# Patient Record
Sex: FEMALE | Race: WHITE | Employment: UNEMPLOYED | URBAN - METROPOLITAN AREA
[De-identification: names, ages, dates, MRNs, and addresses within clinical notes are randomized per-mention and may not be internally consistent; named-entity substitution may affect disease eponyms.]

---

## 2018-10-10 ENCOUNTER — HOSPITAL ENCOUNTER (EMERGENCY)
Age: 53
Discharge: HOME | End: 2018-10-10
Payer: COMMERCIAL

## 2018-10-10 VITALS
HEIGHT: 66 IN | WEIGHT: 275.13 LBS | RESPIRATION RATE: 18 BRPM | SYSTOLIC BLOOD PRESSURE: 145 MMHG | TEMPERATURE: 98.7 F | BODY MASS INDEX: 44.22 KG/M2 | DIASTOLIC BLOOD PRESSURE: 81 MMHG | OXYGEN SATURATION: 97 % | HEART RATE: 83 BPM

## 2018-10-10 DIAGNOSIS — I83.899 BLEEDING FROM VARICOSE VEIN: Primary | ICD-10-CM

## 2018-10-10 PROCEDURE — 99282 EMERGENCY DEPT VISIT SF MDM: CPT

## 2018-10-10 ASSESSMENT — ENCOUNTER SYMPTOMS
NAUSEA: 0
VOMITING: 0

## 2018-10-10 NOTE — ED PROVIDER NOTES
for level 5)     ED Triage Vitals [10/10/18 1557]   BP Temp Temp Source Pulse Resp SpO2 Height Weight   (!) 145/81 98.7 °F (37.1 °C) Oral 83 18 97 % 5' 6\" (1.676 m) 275 lb 2.2 oz (124.8 kg)       Physical Exam   Constitutional: She is oriented to person, place, and time. She appears well-developed and well-nourished. No distress. HENT:   Head: Normocephalic and atraumatic. Right Ear: External ear normal.   Left Ear: External ear normal.   Mouth/Throat: Oropharynx is clear and moist.   Eyes: EOM are normal.   Neck: Normal range of motion. Neck supple. Pulmonary/Chest: Effort normal. No respiratory distress. Musculoskeletal: Normal range of motion. Neurological: She is alert and oriented to person, place, and time. Skin: Skin is warm and dry. She is not diaphoretic. Small pinpoint scabbed area over varicose vein on the medial right ankle. No surrounding erythema or edema. No active bleeding. No TTP. DP pulse 2+   Psychiatric: She has a normal mood and affect. Her behavior is normal. Judgment and thought content normal.       DIFFERENTIAL DIAGNOSIS   Coagulopathy, bleeding varicose vein    DIAGNOSTIC RESULTS       RADIOLOGY:   Non-plain film images such as CT, Ultrasound and MRI are read by the radiologist. Plain radiographic images are visualized and preliminarily interpreted by FLORENTINO Molina with the below findings:      Interpretation per the Radiologist below, if available at the time of this note:    No orders to display         LABS:  No results found for this visit on 10/10/18. All other labs were within normal range or not returned as of this dictation. EMERGENCY DEPARTMENT COURSE and DIFFERENTIAL DIAGNOSIS/MDM:   Vitals:    Vitals:    10/10/18 1557   BP: (!) 145/81   Pulse: 83   Resp: 18   Temp: 98.7 °F (37.1 °C)   TempSrc: Oral   SpO2: 97%   Weight: 275 lb 2.2 oz (124.8 kg)   Height: 5' 6\" (1.676 m)       Patient was seen and examined by myself.   Supervising physician was

## 2020-05-30 ENCOUNTER — APPOINTMENT (OUTPATIENT)
Dept: GENERAL RADIOLOGY | Age: 55
End: 2020-05-30
Payer: COMMERCIAL

## 2020-05-30 ENCOUNTER — APPOINTMENT (OUTPATIENT)
Dept: CT IMAGING | Age: 55
End: 2020-05-30
Payer: COMMERCIAL

## 2020-05-30 ENCOUNTER — HOSPITAL ENCOUNTER (EMERGENCY)
Age: 55
Discharge: HOME | End: 2020-05-30
Payer: COMMERCIAL

## 2020-05-30 VITALS
HEART RATE: 77 BPM | RESPIRATION RATE: 16 BRPM | WEIGHT: 278.88 LBS | SYSTOLIC BLOOD PRESSURE: 136 MMHG | TEMPERATURE: 98 F | OXYGEN SATURATION: 98 % | DIASTOLIC BLOOD PRESSURE: 82 MMHG | HEIGHT: 66 IN | BODY MASS INDEX: 44.82 KG/M2

## 2020-05-30 LAB
A/G RATIO: 0.8 (ref 1.1–2.2)
ALBUMIN SERPL-MCNC: 4 G/DL (ref 3.4–5)
ALP BLD-CCNC: 82 U/L (ref 40–129)
ALT SERPL-CCNC: 29 U/L (ref 10–40)
ANION GAP SERPL CALCULATED.3IONS-SCNC: 11 MMOL/L (ref 3–16)
AST SERPL-CCNC: 31 U/L (ref 15–37)
BASOPHILS ABSOLUTE: 0 K/UL (ref 0–0.2)
BASOPHILS RELATIVE PERCENT: 0.4 %
BILIRUB SERPL-MCNC: 0.5 MG/DL (ref 0–1)
BUN BLDV-MCNC: 11 MG/DL (ref 7–20)
CALCIUM SERPL-MCNC: 10 MG/DL (ref 8.3–10.6)
CHLORIDE BLD-SCNC: 100 MMOL/L (ref 99–110)
CO2: 27 MMOL/L (ref 21–32)
CREAT SERPL-MCNC: 0.8 MG/DL (ref 0.6–1.1)
EOSINOPHILS ABSOLUTE: 0.1 K/UL (ref 0–0.6)
EOSINOPHILS RELATIVE PERCENT: 1.9 %
GFR AFRICAN AMERICAN: >60
GFR NON-AFRICAN AMERICAN: >60
GLOBULIN: 4.8 G/DL
GLUCOSE BLD-MCNC: 89 MG/DL (ref 70–99)
HCG QUALITATIVE: NEGATIVE
HCT VFR BLD CALC: 37.5 % (ref 36–48)
HEMOGLOBIN: 12.1 G/DL (ref 12–16)
INR BLD: 1.27 (ref 0.86–1.14)
LYMPHOCYTES ABSOLUTE: 0.9 K/UL (ref 1–5.1)
LYMPHOCYTES RELATIVE PERCENT: 12.6 %
MCH RBC QN AUTO: 28 PG (ref 26–34)
MCHC RBC AUTO-ENTMCNC: 32.3 G/DL (ref 31–36)
MCV RBC AUTO: 86.7 FL (ref 80–100)
MONOCYTES ABSOLUTE: 0.4 K/UL (ref 0–1.3)
MONOCYTES RELATIVE PERCENT: 6.3 %
NEUTROPHILS ABSOLUTE: 5.6 K/UL (ref 1.7–7.7)
NEUTROPHILS RELATIVE PERCENT: 78.8 %
PDW BLD-RTO: 14.3 % (ref 12.4–15.4)
PLATELET # BLD: 297 K/UL (ref 135–450)
PMV BLD AUTO: 7.9 FL (ref 5–10.5)
POTASSIUM REFLEX MAGNESIUM: 4.6 MMOL/L (ref 3.5–5.1)
PRO-BNP: 32 PG/ML (ref 0–124)
PROTHROMBIN TIME: 14.8 SEC (ref 10–13.2)
RBC # BLD: 4.33 M/UL (ref 4–5.2)
SODIUM BLD-SCNC: 138 MMOL/L (ref 136–145)
TOTAL PROTEIN: 8.8 G/DL (ref 6.4–8.2)
TROPONIN: <0.01 NG/ML
WBC # BLD: 7.1 K/UL (ref 4–11)

## 2020-05-30 PROCEDURE — 90471 IMMUNIZATION ADMIN: CPT | Performed by: PHYSICIAN ASSISTANT

## 2020-05-30 PROCEDURE — 87077 CULTURE AEROBIC IDENTIFY: CPT

## 2020-05-30 PROCEDURE — 87205 SMEAR GRAM STAIN: CPT

## 2020-05-30 PROCEDURE — 90715 TDAP VACCINE 7 YRS/> IM: CPT | Performed by: PHYSICIAN ASSISTANT

## 2020-05-30 PROCEDURE — 84484 ASSAY OF TROPONIN QUANT: CPT

## 2020-05-30 PROCEDURE — 87070 CULTURE OTHR SPECIMN AEROBIC: CPT

## 2020-05-30 PROCEDURE — 87186 SC STD MICRODIL/AGAR DIL: CPT

## 2020-05-30 PROCEDURE — 96374 THER/PROPH/DIAG INJ IV PUSH: CPT

## 2020-05-30 PROCEDURE — 80053 COMPREHEN METABOLIC PANEL: CPT

## 2020-05-30 PROCEDURE — 71250 CT THORAX DX C-: CPT

## 2020-05-30 PROCEDURE — 84703 CHORIONIC GONADOTROPIN ASSAY: CPT

## 2020-05-30 PROCEDURE — 83880 ASSAY OF NATRIURETIC PEPTIDE: CPT

## 2020-05-30 PROCEDURE — 85610 PROTHROMBIN TIME: CPT

## 2020-05-30 PROCEDURE — 36415 COLL VENOUS BLD VENIPUNCTURE: CPT

## 2020-05-30 PROCEDURE — 71046 X-RAY EXAM CHEST 2 VIEWS: CPT

## 2020-05-30 PROCEDURE — 6360000002 HC RX W HCPCS: Performed by: PHYSICIAN ASSISTANT

## 2020-05-30 PROCEDURE — 85025 COMPLETE CBC W/AUTO DIFF WBC: CPT

## 2020-05-30 PROCEDURE — 99284 EMERGENCY DEPT VISIT MOD MDM: CPT

## 2020-05-30 RX ORDER — DEXAMETHASONE SODIUM PHOSPHATE 10 MG/ML
8 INJECTION INTRAMUSCULAR; INTRAVENOUS ONCE
Status: COMPLETED | OUTPATIENT
Start: 2020-05-30 | End: 2020-05-30

## 2020-05-30 RX ORDER — PREDNISONE 10 MG/1
TABLET ORAL
Qty: 18 TABLET | Refills: 0 | Status: SHIPPED | OUTPATIENT
Start: 2020-05-30 | End: 2020-06-09

## 2020-05-30 RX ORDER — FUROSEMIDE 20 MG/1
20 TABLET ORAL EVERY MORNING
Qty: 7 TABLET | Refills: 0 | Status: SHIPPED | OUTPATIENT
Start: 2020-05-30 | End: 2020-06-18

## 2020-05-30 RX ORDER — SULFAMETHOXAZOLE AND TRIMETHOPRIM 800; 160 MG/1; MG/1
1 TABLET ORAL 2 TIMES DAILY
Qty: 20 TABLET | Refills: 0 | Status: SHIPPED | OUTPATIENT
Start: 2020-05-30 | End: 2020-06-09

## 2020-05-30 RX ORDER — LEVOCETIRIZINE DIHYDROCHLORIDE 5 MG/1
5 TABLET, FILM COATED ORAL NIGHTLY
Qty: 15 TABLET | Refills: 0 | Status: SHIPPED | OUTPATIENT
Start: 2020-05-30 | End: 2020-06-14

## 2020-05-30 RX ORDER — CEPHALEXIN 500 MG/1
500 CAPSULE ORAL 3 TIMES DAILY
Qty: 30 CAPSULE | Refills: 0 | Status: SHIPPED | OUTPATIENT
Start: 2020-05-30 | End: 2020-06-09

## 2020-05-30 RX ADMIN — DEXAMETHASONE SODIUM PHOSPHATE 8 MG: 10 INJECTION INTRAMUSCULAR; INTRAVENOUS at 23:19

## 2020-05-30 RX ADMIN — TETANUS TOXOID, REDUCED DIPHTHERIA TOXOID AND ACELLULAR PERTUSSIS VACCINE, ADSORBED 0.5 ML: 5; 2.5; 8; 8; 2.5 SUSPENSION INTRAMUSCULAR at 23:19

## 2020-05-30 ASSESSMENT — PAIN SCALES - GENERAL: PAINLEVEL_OUTOF10: 0

## 2020-05-30 NOTE — ED PROVIDER NOTES
[address] [facility]        Pt Name: Kassie Gambino  MRN: [id number]  [place] 1965  Date of evaluation: 5/30/2020  Provider: Jaja Partida PA-C  PCP: No primary care provider on file. Evaluation by GALILEA. My supervising physician was available for consultation. Emil Whitaker MD      CHIEF COMPLAINT       Chief Complaint   Patient presents with    Wound Check     patient states worsening left lower leg red and skin weeping x 2 days. denies trauma or hx of similar incidents. to ED for eval       HISTORY OF PRESENT ILLNESS   (Location, Timing/Onset, Context/Setting, Quality, Duration, Modifying Factors, Severity, Associated Signs and Symptoms)  Note limiting factors. Kassie Gambino is a 53 y.o. female patient presenting with her mother. Patient with complaint swelling and drainage bilateral lower legs left greater than right. Patient states normally has large legs x5 months. No known diagnosis as to why she has large edematous legs. I could speculate lymphedema perhaps related to morbid obesity as she is 5 foot 6 inches, 278 pounds and a BMI score of 45.1. She reports cutting grass about 1 week ago and discovered itching following itch continues and the drainage has progressed over the past few days. She also has similar rash and excoriation on the volar aspect both forearms. No drainage at this area. She indicates no fevers, chills or systemic ill complaints. She denies any chest pain or shortness of breath. She has not seen a doctor for quite some time. Does not take medication on a regular basis. Tetanus shot not known. I did look in the chart system I find visits going back to 2008 and none since. Does not have a PCP. Nursing Notes were all reviewed and agreed with or any disagreements were addressed in the HPI.     REVIEW OF SYSTEMS    (2-9 systems for level 4, 10 or more for level 5)     Review of Laboratory  [address] [facility]   Phone [phone]       All other labs were within normal range or not returned as of this dictation. EKG: All EKG's are interpreted by the Emergency Department Physician in the absence of a cardiologist.  Please see their note for interpretation of EKG. RADIOLOGY:   Non-plain film images such as CT, Ultrasound and MRI are read by the radiologist. Plain radiographic images are visualized and preliminarily interpreted by the ED Provider with the below findings:        Interpretation per the Radiologist below, if available at the time of this note:    CT CHEST WO CONTRAST   Final Result   Ascending thoracic aortic aneurysm measuring up to 5.0 cm.      6 mm ground-glass nodular opacity in the right upper lobe near the apex. There may be a small calcified component. Recommend follow-up in 6 months. Axillary adenopathy measuring up to 2.4 cm on the left. While findings may   be reactive, metastatic disease cannot be excluded. XR CHEST STANDARD (2 VW)   Final Result   Mild enlargement of the heart. Nonspecific density causing contour defect along the mid right mediastinal   shadow, with suggestion of anterior mediastinal mass or other space-occupying   process on the lateral chest x-ray view. Recommend further evaluation with a   CT of the chest.           No results found.         PROCEDURES   Unless otherwise noted below, none     Procedures    CRITICAL CARE TIME   N/A    CONSULTS:  None      EMERGENCY DEPARTMENT COURSE and DIFFERENTIAL DIAGNOSIS/MDM:   Vitals:    Vitals:    05/30/20 1751 05/30/20 2314   BP: (!) 152/85 136/82   Pulse: 78 77   Resp: 18 16   Temp: 98 °F (36.7 °C) 98 °F (36.7 °C)   TempSrc: Oral Oral   SpO2: 97% 98%   Weight: 278 lb 14.1 oz (126.5 kg)    Height: 5' 6\" (1.676 m)        Patient was given the following medications:  Medications   Tetanus-Diphth-Acell Pertussis (BOOSTRIX) injection 0.5 mL (0.5 mLs Intramuscular Given 5/30/20 2319)   dexamethasone (DECADRON) injection 8 mg (8 mg Intravenous Given 5/30/20 2319)     At approximately 9:10 PM I reassessed patient. I informed laboratory studies unremarkable. Chest x-ray showing mild cardiomegaly without pulmonary edema however does show a mediastinal mass. Recommendation by radiology was to proceed with CT scan. Patient consents. Order placed. CT scan shows evidence of a 5 cm ascending aortic aneurysm. No evidence of dissection. Patient asymptomatic. This is an incidental finding but the patient is informed. As her mother is in the room mother indicates she has the same finding. Patient does have contact dermatitis with subsequent irritation both lower legs with potential emerging cellulitic process both lower legs greater than right. Patient also with dependent edema of unclear reason. I do not find clear evidence of heart failure. Low suspicion for bilateral DVT. I do suspect dependency and obesity. Patient has normal renal function. I did prescribe Lasix 20 mg #7 taking 1 daily. In addition I did prescribe Bactrim DS twice daily x10 days and Keflex 500 mg 3 times daily x10 days. I did prescribe prednisone taper dose beginning 30 mg over the next 9 days. I did prescribe Xyzal 5 mg antihistamine taken nightly. She does not have a healthcare provider. I did place an urgent referral to [facility] provider for general health care purposes. The patient does express understanding of her diagnosis and treatment plan. I did place referral to Dr. Leo Maldonado cardiothoracic surgery for aneurysm. FINAL IMPRESSION      1. Irritant contact dermatitis, unspecified trigger    2. Cellulitis of both lower extremities    3. Bilateral lower extremity edema    4. Stasis dermatitis of both legs    5.  Class 3 severe obesity with body mass index (BMI) of 45.0 to 49.9 in adult, unspecified obesity type, unspecified whether serious comorbidity present ([facility] [place] [facility]    6. Ascending aortic aneurysm [facility])          DISPOSITION/PLAN   DISPOSITION        PATIENT REFERREDTO:  Ronna Rodgers 62  [address] [address]  [phone]    Schedule an appointment as soon as possible for a visit in 1 week      [facility]) Pre-Services  [phone]  Schedule an appointment as soon as possible for a visit in 3 days        DISCHARGE MEDICATIONS:  Discharge Medication List as of 5/30/2020 11:17 PM      START taking these medications    Details   furosemide (LASIX) 20 MG tablet Take 1 tablet by mouth every morning for 7 days, Disp-7 tablet, R-0Print      sulfamethoxazole-trimethoprim (BACTRIM DS) 800-160 MG per tablet Take 1 tablet by mouth 2 times daily for 10 days, Disp-20 tablet, R-0Print      cephALEXin (KEFLEX) 500 MG capsule Take 1 capsule by mouth 3 times daily for 10 days, Disp-30 capsule, R-0Print      predniSONE (DELTASONE) 10 MG tablet 3 tabs po qam for 3 days then 2 tabs qam for 3 days the 1 tab qam for 3 days, Disp-18 tablet, R-0Print      levocetirizine (XYZAL) 5 MG tablet Take 1 tablet by mouth nightly for 15 days, Disp-15 tablet, R-0Print             DISCONTINUED MEDICATIONS:  Discharge Medication List as of 5/30/2020 11:17 PM                 (Please note that portions of this note were completed with a voice recognition program.  Efforts were made to edit the dictations but occasionally words are mis-transcribed. )    Lynn Mcbride PA-C (electronically signed)           Lynn Mcbride PA-C  05/31/20 9724

## 2020-05-31 NOTE — ED TRIAGE NOTES
Pt arrived to ED via private vehicle with complaints of wounds. On initial assessment, pt patient states worsening left lower leg red and skin weeping x 2 days. denies trauma or hx of similar incidents. to ED for eval. Pt states bilateral leg swelling for 5 months. VS noted and stable. Patient A&Ox4. Respirations easy and unlabored. Skin warm and dry and appropriate for ethnicity. No acute distress noted at this time.

## 2020-06-03 LAB
GRAM STAIN RESULT: ABNORMAL
ORGANISM: ABNORMAL
WOUND/ABSCESS: ABNORMAL

## 2020-06-05 ENCOUNTER — TELEPHONE (OUTPATIENT)
Dept: INTERNAL MEDICINE CLINIC | Age: 55
End: 2020-06-05

## 2020-06-18 ENCOUNTER — OFFICE VISIT (OUTPATIENT)
Dept: INTERNAL MEDICINE CLINIC | Age: 55
End: 2020-06-18
Payer: COMMERCIAL

## 2020-06-18 VITALS
WEIGHT: 284.4 LBS | HEART RATE: 83 BPM | TEMPERATURE: 99.1 F | RESPIRATION RATE: 18 BRPM | DIASTOLIC BLOOD PRESSURE: 90 MMHG | SYSTOLIC BLOOD PRESSURE: 154 MMHG | BODY MASS INDEX: 45.9 KG/M2 | OXYGEN SATURATION: 98 %

## 2020-06-18 PROBLEM — R91.1 INCIDENTAL LUNG NODULE, > 3MM AND < 8MM: Status: ACTIVE | Noted: 2020-06-18

## 2020-06-18 PROBLEM — I10 ESSENTIAL HYPERTENSION: Status: ACTIVE | Noted: 2020-06-18

## 2020-06-18 PROBLEM — E66.01 MORBID OBESITY WITH BMI OF 45.0-49.9, ADULT (HCC): Status: ACTIVE | Noted: 2020-06-18

## 2020-06-18 PROBLEM — M79.89 LOCALIZED SWELLING OF LOWER EXTREMITY: Status: ACTIVE | Noted: 2020-06-18

## 2020-06-18 PROBLEM — I71.20 THORACIC AORTIC ANEURYSM WITHOUT RUPTURE: Status: ACTIVE | Noted: 2020-06-18

## 2020-06-18 PROCEDURE — G8427 DOCREV CUR MEDS BY ELIG CLIN: HCPCS | Performed by: INTERNAL MEDICINE

## 2020-06-18 PROCEDURE — 1036F TOBACCO NON-USER: CPT | Performed by: INTERNAL MEDICINE

## 2020-06-18 PROCEDURE — 99204 OFFICE O/P NEW MOD 45 MIN: CPT | Performed by: INTERNAL MEDICINE

## 2020-06-18 PROCEDURE — G8417 CALC BMI ABV UP PARAM F/U: HCPCS | Performed by: INTERNAL MEDICINE

## 2020-06-18 PROCEDURE — 3017F COLORECTAL CA SCREEN DOC REV: CPT | Performed by: INTERNAL MEDICINE

## 2020-06-18 RX ORDER — FUROSEMIDE 20 MG/1
20 TABLET ORAL DAILY
Qty: 60 TABLET | Refills: 3 | Status: SHIPPED | OUTPATIENT
Start: 2020-06-18 | End: 2022-08-04

## 2020-06-18 RX ORDER — LISINOPRIL 20 MG/1
20 TABLET ORAL DAILY
Qty: 60 TABLET | Refills: 3 | Status: SHIPPED | OUTPATIENT
Start: 2020-06-18 | End: 2020-08-06 | Stop reason: DRUGHIGH

## 2020-06-18 ASSESSMENT — ENCOUNTER SYMPTOMS
NAUSEA: 0
BLOOD IN STOOL: 0
SHORTNESS OF BREATH: 0
ABDOMINAL PAIN: 0
ABDOMINAL DISTENTION: 0
VOMITING: 0
SINUS PAIN: 0
COUGH: 0
CONSTIPATION: 0
WHEEZING: 0
RHINORRHEA: 0
EYE DISCHARGE: 0
CHEST TIGHTNESS: 0
PHOTOPHOBIA: 0
DIARRHEA: 0
EYE PAIN: 0
BACK PAIN: 0

## 2020-06-18 ASSESSMENT — PATIENT HEALTH QUESTIONNAIRE - PHQ9
SUM OF ALL RESPONSES TO PHQ9 QUESTIONS 1 & 2: 0
1. LITTLE INTEREST OR PLEASURE IN DOING THINGS: 0
2. FEELING DOWN, DEPRESSED OR HOPELESS: 0
SUM OF ALL RESPONSES TO PHQ QUESTIONS 1-9: 0
SUM OF ALL RESPONSES TO PHQ QUESTIONS 1-9: 0

## 2020-06-18 NOTE — PROGRESS NOTES
2020    Jefferson Samuels (:  1965) is a 53 y.o. female, here for evaluation of the following medical concerns:    HPI  The pt is a 52YOF with morbid obesity who was seen in the ED recently for leg swelling and weeping. She presents to the clinic today to establish PCP. The pt has not been to a doctor since she was 24. She appears to be unkept with poor hygiene. She is able to walk but uses a walker now a days. She does not exercise. No smoking or drinking ETOH. She is quite behind in her screening. She also c/o pain in her right knee ongoing. She also has swelling of her b/l lower ext. This is also chronic. She is wondering if there is anything wrong with her heart. Review of Systems   Constitutional: Negative for activity change, appetite change, chills, fatigue, fever and unexpected weight change. HENT: Negative for congestion, ear discharge, ear pain, mouth sores, nosebleeds, rhinorrhea, sinus pain and sneezing. Eyes: Negative for photophobia, pain, discharge and visual disturbance. Respiratory: Negative for cough, chest tightness, shortness of breath and wheezing. Cardiovascular: Positive for leg swelling. Negative for chest pain and palpitations. Gastrointestinal: Negative for abdominal distention, abdominal pain, blood in stool, constipation, diarrhea, nausea and vomiting. Endocrine: Negative for polydipsia, polyphagia and polyuria. Genitourinary: Negative for dysuria, flank pain, hematuria, menstrual problem, vaginal bleeding and vaginal discharge. Musculoskeletal: Negative for back pain and gait problem. Right knee pain   Skin: Negative for pallor, rash and wound. Neurological: Negative for dizziness, tremors, facial asymmetry, speech difficulty, weakness, numbness and headaches. Psychiatric/Behavioral: Negative for agitation, confusion, dysphoric mood, self-injury and suicidal ideas. The patient is not nervous/anxious.     All other systems reviewed and are negative. Prior to Visit Medications    Medication Sig Taking? Authorizing Provider   lisinopril (PRINIVIL;ZESTRIL) 20 MG tablet Take 1 tablet by mouth daily Yes Vic Holly MD   furosemide (LASIX) 20 MG tablet Take 1 tablet by mouth daily Yes Vic Holly MD   diclofenac sodium (VOLTAREN) 1 % GEL Apply 4 g topically 4 times daily Yes Vic Holly MD        No Known Allergies    Past Medical History:   Diagnosis Date    Back pain 2008    Cataract     Obesity     BMI 45 6/2020       Past Surgical History:   Procedure Laterality Date    CATARACT REMOVAL Left        Social History     Tobacco Use    Smoking status: Never Smoker    Smokeless tobacco: Never Used   Substance Use Topics    Alcohol use: No    Drug use: Never         Family History   Problem Relation Age of Onset    Heart Disease Father         age 76    Breast Cancer Maternal Grandmother     Rheum Arthritis Mother        Vitals:    06/18/20 1325 06/18/20 1327   BP: (!) 152/92 (!) 154/90   Site: Left Upper Arm Left Upper Arm   Position: Sitting Sitting   Cuff Size: Large Adult Large Adult   Pulse: 83    Resp: 18    Temp: 99.1 °F (37.3 °C)    SpO2: 98%    Weight: 284 lb 6.4 oz (129 kg)      Estimated body mass index is 45.9 kg/m² as calculated from the following:    Height as of 5/30/20: 5' 6\" (1.676 m). Weight as of this encounter: 284 lb 6.4 oz (129 kg). Physical Exam  Vitals signs and nursing note reviewed. Constitutional:       Appearance: Normal appearance. She is obese. HENT:      Head: Normocephalic and atraumatic. Right Ear: Tympanic membrane normal.      Left Ear: Tympanic membrane and ear canal normal.      Nose: Nose normal.      Mouth/Throat:      Mouth: Mucous membranes are moist.   Eyes:      Extraocular Movements: Extraocular movements intact. Pupils: Pupils are equal, round, and reactive to light. Neck:      Musculoskeletal: Normal range of motion and neck supple.    Cardiovascular:      Rate osteoartthritis prn diclofenac  - XR KNEE RIGHT (3 VIEWS); Future    6. Thoracic aortic aneurysm without rupture [facility])    - Chris Muñoz MD, Vascular Surgery, [facility]    7. Essential hypertension  Started on lisinopril and lasix daily    8. Morbid obesity with BMI of 45.0-49.9, adult ([address]  Advised about wt loss will address again in the next visit    9. Localized swelling of lower extremity  Started on lasix    10. Incidental lung nodule, > 3mm and < 8mm  F/u in 6 months      Return in about 6 weeks (around 7/30/2020).

## 2020-06-19 ENCOUNTER — TELEPHONE (OUTPATIENT)
Dept: SURGERY | Age: 55
End: 2020-06-19

## 2020-06-19 NOTE — TELEPHONE ENCOUNTER
Dr. Buddy Coleman received a referral for Karime Lima for a thoracic aortic aneurysm. Germania Yepez said she should really be seeing Dr. Lily Mai in cardiology, as he doesn't do this type of aortic aneurysm, so I'm sending the referral back to the office. Just wanted you to know why it came back to you.

## 2020-06-23 ENCOUNTER — HOSPITAL ENCOUNTER (OUTPATIENT)
Dept: GENERAL RADIOLOGY | Age: 55
Discharge: HOME | End: 2020-06-23
Payer: COMMERCIAL

## 2020-06-23 ENCOUNTER — HOSPITAL ENCOUNTER (OUTPATIENT)
Age: 55
Discharge: HOME | End: 2020-06-23
Payer: COMMERCIAL

## 2020-06-23 PROCEDURE — 73562 X-RAY EXAM OF KNEE 3: CPT

## 2020-06-26 ENCOUNTER — NURSE ONLY (OUTPATIENT)
Dept: INTERNAL MEDICINE CLINIC | Age: 55
End: 2020-06-26
Payer: COMMERCIAL

## 2020-06-26 LAB
ALBUMIN SERPL-MCNC: 4 G/DL (ref 3.4–5)
ALP BLD-CCNC: 97 U/L (ref 40–129)
ALT SERPL-CCNC: 16 U/L (ref 10–40)
AST SERPL-CCNC: 18 U/L (ref 15–37)
BILIRUB SERPL-MCNC: 0.3 MG/DL (ref 0–1)
BILIRUBIN DIRECT: <0.2 MG/DL (ref 0–0.3)
BILIRUBIN, INDIRECT: NORMAL MG/DL (ref 0–1)
CHOLESTEROL, FASTING: 175 MG/DL (ref 0–199)
HDLC SERPL-MCNC: 31 MG/DL (ref 40–60)
HEPATITIS C ANTIBODY INTERPRETATION: NORMAL
LDL CHOLESTEROL CALCULATED: 104 MG/DL
TOTAL PROTEIN: 8.1 G/DL (ref 6.4–8.2)
TRIGLYCERIDE, FASTING: 200 MG/DL (ref 0–150)
VLDLC SERPL CALC-MCNC: 40 MG/DL

## 2020-06-26 PROCEDURE — 36415 COLL VENOUS BLD VENIPUNCTURE: CPT | Performed by: INTERNAL MEDICINE

## 2020-06-27 LAB
ESTIMATED AVERAGE GLUCOSE: 116.9 MG/DL
HBA1C MFR BLD: 5.7 %

## 2020-06-28 RX ORDER — ATORVASTATIN CALCIUM 20 MG/1
20 TABLET, FILM COATED ORAL DAILY
Qty: 90 TABLET | Refills: 1 | Status: SHIPPED | OUTPATIENT
Start: 2020-06-28 | End: 2022-08-04

## 2020-07-29 ENCOUNTER — TELEPHONE (OUTPATIENT)
Dept: INTERNAL MEDICINE CLINIC | Age: 55
End: 2020-07-29

## 2020-08-06 ENCOUNTER — OFFICE VISIT (OUTPATIENT)
Dept: INTERNAL MEDICINE CLINIC | Age: 55
End: 2020-08-06
Payer: COMMERCIAL

## 2020-08-06 VITALS
DIASTOLIC BLOOD PRESSURE: 94 MMHG | TEMPERATURE: 98.7 F | BODY MASS INDEX: 44.45 KG/M2 | OXYGEN SATURATION: 98 % | RESPIRATION RATE: 18 BRPM | HEART RATE: 74 BPM | SYSTOLIC BLOOD PRESSURE: 140 MMHG | WEIGHT: 275.4 LBS

## 2020-08-06 PROBLEM — R91.8 LUNG NODULES: Status: ACTIVE | Noted: 2020-08-06

## 2020-08-06 PROBLEM — E78.2 MIXED HYPERLIPIDEMIA: Status: ACTIVE | Noted: 2020-08-06

## 2020-08-06 PROBLEM — R73.03 PREDIABETES: Status: ACTIVE | Noted: 2020-08-06

## 2020-08-06 PROBLEM — R59.0 AXILLARY LYMPHADENOPATHY: Status: ACTIVE | Noted: 2020-08-06

## 2020-08-06 PROBLEM — R06.83 SNORING: Status: ACTIVE | Noted: 2020-08-06

## 2020-08-06 LAB
ANION GAP SERPL CALCULATED.3IONS-SCNC: 11 MMOL/L (ref 3–16)
BUN BLDV-MCNC: 11 MG/DL (ref 7–20)
CALCIUM SERPL-MCNC: 9.9 MG/DL (ref 8.3–10.6)
CHLORIDE BLD-SCNC: 105 MMOL/L (ref 99–110)
CO2: 24 MMOL/L (ref 21–32)
CREAT SERPL-MCNC: 0.9 MG/DL (ref 0.6–1.1)
GFR AFRICAN AMERICAN: >60
GFR NON-AFRICAN AMERICAN: >60
GLUCOSE BLD-MCNC: 102 MG/DL (ref 70–99)
POTASSIUM SERPL-SCNC: 4.3 MMOL/L (ref 3.5–5.1)
SODIUM BLD-SCNC: 140 MMOL/L (ref 136–145)

## 2020-08-06 PROCEDURE — 99215 OFFICE O/P EST HI 40 MIN: CPT | Performed by: INTERNAL MEDICINE

## 2020-08-06 PROCEDURE — G8417 CALC BMI ABV UP PARAM F/U: HCPCS | Performed by: INTERNAL MEDICINE

## 2020-08-06 PROCEDURE — 1036F TOBACCO NON-USER: CPT | Performed by: INTERNAL MEDICINE

## 2020-08-06 PROCEDURE — G8427 DOCREV CUR MEDS BY ELIG CLIN: HCPCS | Performed by: INTERNAL MEDICINE

## 2020-08-06 PROCEDURE — 36415 COLL VENOUS BLD VENIPUNCTURE: CPT | Performed by: INTERNAL MEDICINE

## 2020-08-06 PROCEDURE — 3017F COLORECTAL CA SCREEN DOC REV: CPT | Performed by: INTERNAL MEDICINE

## 2020-08-06 RX ORDER — LISINOPRIL 30 MG/1
30 TABLET ORAL DAILY
Qty: 30 TABLET | Refills: 1 | Status: SHIPPED | OUTPATIENT
Start: 2020-08-06 | End: 2022-08-04

## 2020-08-06 SDOH — ECONOMIC STABILITY: FOOD INSECURITY: WITHIN THE PAST 12 MONTHS, THE FOOD YOU BOUGHT JUST DIDN'T LAST AND YOU DIDN'T HAVE MONEY TO GET MORE.: NEVER TRUE

## 2020-08-06 SDOH — ECONOMIC STABILITY: INCOME INSECURITY: HOW HARD IS IT FOR YOU TO PAY FOR THE VERY BASICS LIKE FOOD, HOUSING, MEDICAL CARE, AND HEATING?: NOT HARD AT ALL

## 2020-08-06 SDOH — ECONOMIC STABILITY: TRANSPORTATION INSECURITY
IN THE PAST 12 MONTHS, HAS THE LACK OF TRANSPORTATION KEPT YOU FROM MEDICAL APPOINTMENTS OR FROM GETTING MEDICATIONS?: NO

## 2020-08-06 SDOH — ECONOMIC STABILITY: TRANSPORTATION INSECURITY
IN THE PAST 12 MONTHS, HAS LACK OF TRANSPORTATION KEPT YOU FROM MEETINGS, WORK, OR FROM GETTING THINGS NEEDED FOR DAILY LIVING?: NO

## 2020-08-06 SDOH — ECONOMIC STABILITY: FOOD INSECURITY: WITHIN THE PAST 12 MONTHS, YOU WORRIED THAT YOUR FOOD WOULD RUN OUT BEFORE YOU GOT MONEY TO BUY MORE.: NEVER TRUE

## 2020-08-06 ASSESSMENT — ENCOUNTER SYMPTOMS
WHEEZING: 0
ABDOMINAL DISTENTION: 0
ABDOMINAL PAIN: 0
CHEST TIGHTNESS: 0
VOMITING: 0
NAUSEA: 0
SHORTNESS OF BREATH: 0
COUGH: 0
PHOTOPHOBIA: 0
CONSTIPATION: 0
EYE PAIN: 0
EYE DISCHARGE: 0
BLOOD IN STOOL: 0
RHINORRHEA: 0
SINUS PAIN: 0
DIARRHEA: 0
BACK PAIN: 0

## 2020-08-06 NOTE — PROGRESS NOTES
2020     Rosendo Lott (:  1965) is a 53 y.o. female, here for evaluation of the following medical concerns:    HPI    The pt is doing much better in terms of her leg swelling. She is putting on ACE wraps and did go to the wound clinic. She is also using lasix as well. Her mother had lung cancer and was undergoing radiation therapy so she did not get a chance to go for other referrals. She has lung cancer. She is now doing better. She has been busy with taking care of the mom. No exercise on regular basis. She is not able to get the CT scan of her abdomen as the insurance will not cover it. She has not had mmmogram in a long time pr a colonoscopy or pap's smear. She does not exercise and has not been able to loose weight. She also tells me that she snores a lot when she sleeps    Review of Systems   Constitutional: Negative for activity change, appetite change, chills, fatigue, fever and unexpected weight change. Snoring   HENT: Negative for congestion, ear discharge, ear pain, mouth sores, nosebleeds, rhinorrhea, sinus pain and sneezing. Eyes: Negative for photophobia, pain, discharge and visual disturbance. Respiratory: Negative for cough, chest tightness, shortness of breath and wheezing. Cardiovascular: Negative for chest pain, palpitations and leg swelling. Gastrointestinal: Negative for abdominal distention, abdominal pain, blood in stool, constipation, diarrhea, nausea and vomiting. Endocrine: Negative for polydipsia, polyphagia and polyuria. Genitourinary: Negative for dysuria, flank pain, hematuria, menstrual problem, vaginal bleeding and vaginal discharge. Musculoskeletal: Negative for back pain and gait problem. Leg swelling   Skin: Negative for pallor, rash and wound. Neurological: Negative for dizziness, tremors, facial asymmetry, speech difficulty, weakness, numbness and headaches.    Psychiatric/Behavioral: Negative for agitation, confusion, dysphoric mood, self-injury and suicidal ideas. The patient is not nervous/anxious. All other systems reviewed and are negative. Prior to Visit Medications    Medication Sig Taking? Authorizing Provider   lisinopril (PRINIVIL;ZESTRIL) 30 MG tablet Take 1 tablet by mouth daily Yes Magdalena Foley MD   metFORMIN (GLUCOPHAGE) 500 MG tablet Take 1 tablet by mouth 2 times daily (with meals) Yes Magdalena Foley MD   atorvastatin (LIPITOR) 20 MG tablet Take 1 tablet by mouth daily Yes Magdalena Foley MD   furosemide (LASIX) 20 MG tablet Take 1 tablet by mouth daily Yes Magdalena Foley MD   diclofenac sodium (VOLTAREN) 1 % GEL Apply 4 g topically 4 times daily  Patient not taking: Reported on 8/6/2020  Magdalena Foley MD        Social History     Tobacco Use    Smoking status: Never Smoker    Smokeless tobacco: Never Used   Substance Use Topics    Alcohol use: No    Drug use: Never       Vitals:    08/06/20 1006 08/06/20 1008   BP: (!) 142/98 (!) 140/94   Site: Right Upper Arm Right Upper Arm   Position: Sitting Sitting   Cuff Size: Large Adult Large Adult   Pulse: 74    Resp: 18    Temp: 98.7 °F (37.1 °C)    SpO2: 98%    Weight: 275 lb 6.4 oz (124.9 kg)      Estimated body mass index is 44.45 kg/m² as calculated from the following:    Height as of 5/30/20: 5' 6\" (1.676 m). Weight as of this encounter: 275 lb 6.4 oz (124.9 kg). Physical Exam  Vitals signs and nursing note reviewed. Constitutional:       Appearance: Normal appearance. She is obese. She is not ill-appearing. HENT:      Head: Normocephalic and atraumatic. Right Ear: Tympanic membrane and external ear normal.      Left Ear: Tympanic membrane and external ear normal.      Ears:      Comments: Cerumen in bilateral ears     Nose: Nose normal.      Mouth/Throat:      Mouth: Mucous membranes are moist.   Eyes:      Extraocular Movements: Extraocular movements intact. Pupils: Pupils are equal, round, and reactive to light.    Neck: Musculoskeletal: Normal range of motion and neck supple. Cardiovascular:      Rate and Rhythm: Normal rate and regular rhythm. Pulses: Normal pulses. Heart sounds: Normal heart sounds. Pulmonary:      Effort: Pulmonary effort is normal. No respiratory distress. Breath sounds: Normal breath sounds. No wheezing. Abdominal:      General: Bowel sounds are normal. There is distension. Palpations: Abdomen is soft. Tenderness: There is no abdominal tenderness. Musculoskeletal: Normal range of motion. General: Swelling present. No tenderness. Skin:     General: Skin is warm and dry. Coloration: Skin is not jaundiced or pale. Findings: No rash. Neurological:      General: No focal deficit present. Mental Status: She is alert and oriented to person, place, and time. Cranial Nerves: No cranial nerve deficit. Sensory: No sensory deficit. Motor: No weakness. Psychiatric:         Mood and Affect: Mood normal.         Behavior: Behavior normal.         ASSESSMENT/PLAN:  1. Mixed hyperlipidemia  Will continue on the statin same dose and repeat lipid in 3-6 months    2. Prediabetes  Started on metformin  3. Essential hypertension  Dose of lisinopril adjusted upwards  - BASIC METABOLIC PANEL; Future  - BASIC METABOLIC PANEL    4. Morbid obesity with BMI of 45.0-49.9, adult ([facility] [address]  extensive counseling about weight reduction she plan to reduce at weight to 270 by the next visit    5. Localized swelling of lower extremity  improving    6. Screening for breast cancer  Mammogram ordered  7. Axillary lymphadenopathy  Unclear etiology but will order mammogram    8. Snoring    - Radha Conrad MD, Sleep Medicine, [facility]    9. Screening for HIV (human immunodeficiency virus)  Has never been screened for it  - HIV Screen; Future  - HIV Screen    10> Thoracic aortic aneurysm    5 cm  sent to vascular surgery    11.  Lung nodules  On the CT from 5/30/2020. Will need repeat CT in 6 months      Return in about 8 weeks (around 10/1/2020). The pt was seen and examined and plan of care discussed. The visit time was 45 min with more then 50% of the time was spent on counseling the pt regarding diet and life style changes, weight loss and discussing medication compliance/side effects and coordinating care.

## 2020-08-06 NOTE — PATIENT INSTRUCTIONS
Patient Education        Learning About Breast Cancer Screening  What is breast cancer screening? Breast cancer occurs when cells that are not normal grow in one or both of your breasts. Screening tests can help find breast cancer early. Cancer is easier to treat when it's found early. Having concerns about breast cancer is common. That's why it's important to talk with your doctor about when to start and how often to get screened for breast cancer. How is breast cancer screening done? Several screening tests can be used to check for breast cancer. Mammograms. These tests check for signs of cancer using X-rays. They can show tumors that are too small for you or your doctor to feel. During a mammogram, a machine squeezes your breasts to make them flatter and easier to X-ray. At least two pictures are taken of each breast. One is taken from the top and one from the side. 3-D mammograms. These tests are also called digital breast tomosynthesis. Your breast is positioned on a flat plate. A top plate is pressed against your breast to keep it in position. The X-ray arm then moves in an arc above the breast and takes many pictures. A computer uses these X-rays to create a three-dimensional image. Clinical breast exam.   In this exam, your doctor carefully feels your breasts and under your arms to check for lumps or other changes. When should you get screened? Talk with your doctor about when you should start being tested for breast cancer. How often you get tested and the kind of tests you get will depend on your age and your risk. The guidelines that follow are for women who have an average risk for breast cancer. If you have a higher risk, such as having a family history of breast cancer in multiple relatives or at a young age, your doctor may recommend different screening for you. · Ages 21 to 44: Some experts recommend that women have a clinical breast exam every 1 to 3 years, starting at age 22. Ask your doctor how often you should have this test. If you have a high risk for breast cancer, talk with your doctor about the best schedule and tests for you. · Ages 36 and older: Talk with your doctor about how often you should have mammograms and clinical breast exams. What is your risk for breast cancer? If you don't already know your risk of breast cancer, you can ask your doctor about it. You can also look it up at www.cancer.gov/bcrisktool/. If your doctor says that you have a high or very high risk, ask about ways to reduce your risk. These could include getting extra screening, taking medicine, or having surgery. If you have a strong family history of breast cancer, ask your doctor about genetic testing. What steps can you take to stay healthy? Some things that increase your risk of breast cancer, such as your age and being female, cannot be controlled. But you can do some things to stay as healthy as you can. · Learn what your breasts normally look and feel like. If you notice any changes, tell your doctor. · If you drink alcohol, limit how much you drink. Any amount of alcohol may increase your risk for some types of cancer. · If you smoke, quit. When you quit smoking, you lower your chances of getting many types of cancer. You can also do your best to eat well, be active, and stay at a healthy weight. Eating healthy foods and being active every day, as well as staying at a healthy weight, may help prevent cancer. Where can you learn more? Go to https://Mirapoint Softwareariel.iSnap. org and sign in to your [employer] account. Enter Y283 in the [facility] box to learn more about \"Learning About Breast Cancer Screening. \"     If you do not have an account, please click on the \"Sign Up Now\" link. Current as of: August 22, 2019               Content Version: 12.5  © 2338-1154 Healthwise, Incorporated. Care instructions adapted under license by [facility] ([facility]).  If you have questions about a medical condition or this instruction, always ask your healthcare professional. [address] any warranty or liability for your use of this information. Patient Education        Starting a Weight Loss Plan: Care Instructions  Your Care Instructions     If you are thinking about losing weight, it can be hard to know where to start. Your doctor can help you set up a weight loss plan that best meets your needs. You may want to take a class on nutrition or exercise, or join a weight loss support group. If you have questions about how to make changes to your eating or exercise habits, ask your doctor about seeing a registered dietitian or an exercise specialist.  It can be a big challenge to lose weight. But you do not have to make huge changes at once. Make small changes, and stick with them. When those changes become habit, add a few more changes. If you do not think you are ready to make changes right now, try to pick a date in the future. Make an appointment to see your doctor to discuss whether the time is right for you to start a plan. Follow-up care is a key part of your treatment and safety. Be sure to make and go to all appointments, and call your doctor if you are having problems. It's also a good idea to know your test results and keep a list of the medicines you take. How can you care for yourself at home? · Set realistic goals. Many people expect to lose much more weight than is likely. A weight loss of 5% to 10% of your body weight may be enough to improve your health. · Get family and friends involved to provide support. Talk to them about why you are trying to lose weight, and ask them to help. They can help by participating in exercise and having meals with you, even if they may be eating something different. · Find what works best for you. If you do not have time or do not like to cook, a program that offers meal replacement bars or shakes may be better for you.  Or if you like to prepare meals, finding a plan that includes daily menus and recipes may be best.  · Ask your doctor about other health professionals who can help you achieve your weight loss goals. ? A dietitian can help you make healthy changes in your diet. ? An exercise specialist or  can help you develop a safe and effective exercise program.  ? A counselor or psychiatrist can help you cope with issues such as depression, anxiety, or family problems that can make it hard to focus on weight loss. · Consider joining a support group for people who are trying to lose weight. Your doctor can suggest groups in your area. Where can you learn more? Go to https://Allostera Pharmapepiceweb.Shopnlist. org and sign in to your [employer] account. Enter Z090 in the [employer] box to learn more about \"Starting a Weight Loss Plan: Care Instructions. \"     If you do not have an account, please click on the \"Sign Up Now\" link. Current as of: December 11, 2019               Content Version: 12.5  © 2006-2020 [employer]. Care instructions adapted under license by [facility] ([facility]). If you have questions about a medical condition or this instruction, always ask your healthcare professional. [address] any warranty or liability for your use of this information. Patient Education         Healthy Weight: Make Your Plan (02:22)  Your health professional recommends that you watch this short online health video. Make your own plan for reaching a healthy weight. How to watch the video    Scan the QR code   OR Visit the website    [email or web address]   Current as of: December 11, 2019               Content Version: 12.5  © 2006-2020 [employer]. Care instructions adapted under license by [facility] ([facility]).  If you have questions about a medical condition or this instruction, always ask your healthcare professional. [address] any warranty or liability for your use of this information. Patient Education         Healthy Weight: What Works (02:04)  Your health professional recommends that you watch this short online health video. Learn three proven strategies to help reach a healthy weight. How to watch the video    Scan the QR code   OR Visit the website    [email or web address] [email or web address]   Current as of: December 11, 2019               Content Version: 12.5  © 2006-2020 [employer]. Care instructions adapted under license by [facility] ([facility]). If you have questions about a medical condition or this instruction, always ask your healthcare professional. [place] [address] any warranty or liability for your use of this information. Patient Education        Breast Self-Exam: Care Instructions  Your Care Instructions     A breast self-exam is when you check your breasts for lumps or changes. This regular exam helps you learn how your breasts normally look and feel. Most breast problems or changes are not because of cancer. Breast self-exam is not a substitute for a mammogram. Having regular breast exams by your doctor and regular mammograms improve your chances of finding any problems with your breasts. Some women set a time each month to do a step-by-step breast self-exam. Other women like a less formal system. They might look at their breasts as they brush their teeth, or feel their breasts once in a while in the shower. If you notice a change in your breast, tell your doctor. Follow-up care is a key part of your treatment and safety. Be sure to make and go to all appointments, and call your doctor if you are having problems. It's also a good idea to know your test results and keep a list of the medicines you take. How do you do a breast self-exam?  · The best time to examine your breasts is usually one week after your menstrual period begins. Your breasts should not be tender then.  If you do not have periods, you might do your exam on a day of the month that is easy to remember. · To examine your breasts:  ? Remove all your clothes above the waist and lie down. When you are lying down, your breast tissue spreads evenly over your chest wall, which makes it easier to feel all your breast tissue. ? Use the pads--not the fingertips--of the 3 middle fingers of your left hand to check your right breast. Move your fingers slowly in small coin-sized circles that overlap. ? Use three levels of pressure to feel of all your breast tissue. Use light pressure to feel the tissue close to the skin surface. Use medium pressure to feel a little deeper. Use firm pressure to feel your tissue close to your breastbone and ribs. Use each pressure level to feel your breast tissue before moving on to the next spot. ? Check your entire breast, moving up and down as if following a strip from the collarbone to the bra line, and from the armpit to the ribs. Repeat until you have covered the entire breast.  ? Repeat this procedure for your left breast, using the pads of the 3 middle fingers of your right hand. · To examine your breasts while in the shower:  ? Place one arm over your head and lightly soap your breast on that side. ? Using the pads of your fingers, gently move your hand over your breast (in the strip pattern described above), feeling carefully for any lumps or changes. ? Repeat for the other breast.  · Have your doctor inspect anything you notice to see if you need further testing. Where can you learn more? Go to https://CellTech Metalsariel.Pixel Press. org and sign in to your [employer] account. Enter P148 in the [facility] box to learn more about \"Breast Self-Exam: Care Instructions. \"     If you do not have an account, please click on the \"Sign Up Now\" link. Current as of: August 22, 2019               Content Version: 12.5  © 3548-1471 Healthwise, Incorporated.    Care instructions adapted under license by [facility] ([facility]). If you have questions about a medical condition or this instruction, always ask your healthcare professional. [address] any warranty or liability for your use of this information. Patient Education        Learning About Breast Cancer Screening  What is breast cancer screening? Breast cancer occurs when cells that are not normal grow in one or both of your breasts. Screening tests can help find breast cancer early. Cancer is easier to treat when it's found early. Having concerns about breast cancer is common. That's why it's important to talk with your doctor about when to start and how often to get screened for breast cancer. How is breast cancer screening done? Several screening tests can be used to check for breast cancer. Mammograms. These tests check for signs of cancer using X-rays. They can show tumors that are too small for you or your doctor to feel. During a mammogram, a machine squeezes your breasts to make them flatter and easier to X-ray. At least two pictures are taken of each breast. One is taken from the top and one from the side. 3-D mammograms. These tests are also called digital breast tomosynthesis. Your breast is positioned on a flat plate. A top plate is pressed against your breast to keep it in position. The X-ray arm then moves in an arc above the breast and takes many pictures. A computer uses these X-rays to create a three-dimensional image. Clinical breast exam.   In this exam, your doctor carefully feels your breasts and under your arms to check for lumps or other changes. When should you get screened? Talk with your doctor about when you should start being tested for breast cancer. How often you get tested and the kind of tests you get will depend on your age and your risk. The guidelines that follow are for women who have an average risk for breast cancer.  If you have a higher risk, such as having a family history of breast cancer in multiple relatives or at a young age, your doctor may recommend different screening for you. · Ages 21 to 44: Some experts recommend that women have a clinical breast exam every 1 to 3 years, starting at age 22. Ask your doctor how often you should have this test. If you have a high risk for breast cancer, talk with your doctor about the best schedule and tests for you. · Ages 36 and older: Talk with your doctor about how often you should have mammograms and clinical breast exams. What is your risk for breast cancer? If you don't already know your risk of breast cancer, you can ask your doctor about it. You can also look it up at www.cancer.gov/bcrisktool/. If your doctor says that you have a high or very high risk, ask about ways to reduce your risk. These could include getting extra screening, taking medicine, or having surgery. If you have a strong family history of breast cancer, ask your doctor about genetic testing. What steps can you take to stay healthy? Some things that increase your risk of breast cancer, such as your age and being female, cannot be controlled. But you can do some things to stay as healthy as you can. · Learn what your breasts normally look and feel like. If you notice any changes, tell your doctor. · If you drink alcohol, limit how much you drink. Any amount of alcohol may increase your risk for some types of cancer. · If you smoke, quit. When you quit smoking, you lower your chances of getting many types of cancer. You can also do your best to eat well, be active, and stay at a healthy weight. Eating healthy foods and being active every day, as well as staying at a healthy weight, may help prevent cancer. Where can you learn more? Go to https://kary.healthQPSoftware. org and sign in to your [employer] account. Enter Z542 in the [employer] box to learn more about \"Learning About Breast Cancer Screening. \"     If you do not have an account, please click on the \"Sign Up Now\" link. Current as of: August 22, 2019               Content Version: 12.5  © 6672-7989 Healthwise, Incorporated. Care instructions adapted under license by [facility] ([facility]). If you have questions about a medical condition or this instruction, always ask your healthcare professional. [address] any warranty or liability for your use of this information.

## 2020-08-07 LAB
HIV AG/AB: NORMAL
HIV ANTIGEN: NORMAL
HIV-1 ANTIBODY: NORMAL
HIV-2 AB: NORMAL

## 2020-08-19 ENCOUNTER — TELEPHONE (OUTPATIENT)
Dept: INTERNAL MEDICINE CLINIC | Age: 55
End: 2020-08-19

## 2020-08-19 NOTE — TELEPHONE ENCOUNTER
It was ordered for her b/l leg swelling to rule out cardiac etiology although it appears more likely sec to her weight. We can possilbly weight and see how she does with lasix.  She will need to come back and see us 4 weeks after her initial appointment to see if lasix is helpng

## 2020-08-19 NOTE — TELEPHONE ENCOUNTER
Echocardiogram was denied by insurance.     [facility]#[id number]  Call for [id number]  [phone]

## 2020-08-20 NOTE — TELEPHONE ENCOUNTER
Spoke with her and asked her to make an appointment and told her about the denial. We will try to give medications a try to see if that helps.

## 2021-10-11 ENCOUNTER — OFFICE VISIT (OUTPATIENT)
Dept: ORTHOPEDIC SURGERY | Age: 56
End: 2021-10-11
Payer: COMMERCIAL

## 2021-10-11 VITALS — BODY MASS INDEX: 47.09 KG/M2 | RESPIRATION RATE: 16 BRPM | HEIGHT: 66 IN | WEIGHT: 293 LBS

## 2021-10-11 DIAGNOSIS — R52 PAIN: Primary | ICD-10-CM

## 2021-10-11 DIAGNOSIS — M17.11 ARTHRITIS OF KNEE, RIGHT: ICD-10-CM

## 2021-10-11 DIAGNOSIS — M17.12 ARTHRITIS OF KNEE, LEFT: ICD-10-CM

## 2021-10-11 PROCEDURE — G8427 DOCREV CUR MEDS BY ELIG CLIN: HCPCS | Performed by: PHYSICIAN ASSISTANT

## 2021-10-11 PROCEDURE — 99204 OFFICE O/P NEW MOD 45 MIN: CPT | Performed by: PHYSICIAN ASSISTANT

## 2021-10-11 PROCEDURE — G8417 CALC BMI ABV UP PARAM F/U: HCPCS | Performed by: PHYSICIAN ASSISTANT

## 2021-10-11 PROCEDURE — 3017F COLORECTAL CA SCREEN DOC REV: CPT | Performed by: PHYSICIAN ASSISTANT

## 2021-10-11 PROCEDURE — 1036F TOBACCO NON-USER: CPT | Performed by: PHYSICIAN ASSISTANT

## 2021-10-11 PROCEDURE — G8484 FLU IMMUNIZE NO ADMIN: HCPCS | Performed by: PHYSICIAN ASSISTANT

## 2021-10-12 PROBLEM — M17.12 ARTHRITIS OF KNEE, LEFT: Status: ACTIVE | Noted: 2021-10-12

## 2021-10-12 PROBLEM — M17.11 ARTHRITIS OF KNEE, RIGHT: Status: ACTIVE | Noted: 2021-10-12

## 2021-10-12 NOTE — PROGRESS NOTES
Subjective:      Patient ID: Julio Lopez is a 61 y.o. female with a PMH of mixed hyperlipidemia, prediabetes, axillary lymphadenopathy, long nodule, thoracic aortic aneurysm without rupture, essential hypertension, morbid obesity with BMI of 45.0-49.9 and localized swelling of the lower extremity who is here for an initial evaluation of bilateral knee pain. She describes having chronic knee pain for several years which is progressively worsening. Over the past 10 months she has had difficulty ambulating and getting about. Pain Scale 10/10 VAS. Location of pain medial and anterior aspect of the left and right knee. Pain is worse with weightbearing. Pain improves with sedentary activities. Previous treatments have included Tylenol without relief. Review of Systems:  I have reviewed the clinically relevant past medical history, medications, allergies, family history, social history, and 13 point Review of Systems from the patient's recent history form & documented any details relevant to today's presenting complaints in the history above. The patient's self-reported past medical history, medications, allergies, family history, social history, and Review of Systems form from today's date have been scanned into the chart under the \"Media\" tab. As outlined in the HPI. Negative for fever or chills. Positive for poly-joint pain, swelling and stiffness. Negative for numbness or tingling into the affected extremity.      Past Medical History:   Diagnosis Date    Back pain 2008    Cataract     Obesity     BMI 45 6/2020       Family History   Problem Relation Age of Onset    Heart Disease Father         age 71    Breast Cancer Maternal Grandmother     Rheum Arthritis Mother        Past Surgical History:   Procedure Laterality Date    CATARACT REMOVAL Left        Social History     Occupational History    Not on file   Tobacco Use    Smoking status: Never Smoker    Smokeless tobacco: Never Used Vaping Use    Vaping Use: Never used   Substance and Sexual Activity    Alcohol use: No    Drug use: Never    Sexual activity: Not on file       Current Outpatient Medications   Medication Sig Dispense Refill    lisinopril (PRINIVIL;ZESTRIL) 30 MG tablet Take 1 tablet by mouth daily 30 tablet 1    metFORMIN (GLUCOPHAGE) 500 MG tablet Take 1 tablet by mouth 2 times daily (with meals) 60 tablet 5    atorvastatin (LIPITOR) 20 MG tablet Take 1 tablet by mouth daily 90 tablet 1    furosemide (LASIX) 20 MG tablet Take 1 tablet by mouth daily 60 tablet 3    diclofenac sodium (VOLTAREN) 1 % GEL Apply 4 g topically 4 times daily (Patient not taking: Reported on 8/6/2020) 4 Tube 1     No current facility-administered medications for this visit. No Known Allergies      Objective:     She is alert, oriented x 3, pleasant, well nourished, developed and in no acute distress. Resp 16   Ht 5' 6\" (1.676 m)   Wt (!) 301 lb (136.5 kg)   BMI 48.58 kg/m²      Examination of the left knee: Inspection of the skin and soft tissues reveals no erythema. The overall alignment of the knee is varus. Gait is antalgic. There is mild intra-articular effusion. AROM:           PROM:  Extension-         0                   0  Flexion -           100               100  There moderate pain associated with ROM testing. Medial joint line is tender to palpation. Lateral joint line is somewhat tender to palpation. Pes anserine bursa not tender to palpation. Patellar tendon is not tender to palpation. Quadriceps tendon is not tender to palpation. Collateral ligaments is not tender to palpation. Popliteal fossa is not tender to palpation. Varus Stress testing negative for instability. Valgus Stress testing negative for instability. Patellar Compression testing is positive for pain or crepitus. Extensor Mechanism is intact. Examination of the right knee:    Inspection of the skin and soft tissues reveals no erythema. The overall alignment of the knee is varus. Gait is antalgic. There is mild intra-articular effusion. AROM:           PROM:  Extension-         0                   0  Flexion -           100               100  There moderate pain associated with ROM testing. Medial joint line is tender to palpation. Lateral joint line is somewhat tender to palpation. Pes anserine bursa not tender to palpation. Patellar tendon is not tender to palpation. Quadriceps tendon is not tender to palpation. Collateral ligaments is not tender to palpation. Popliteal fossa is not tender to palpation. Varus Stress testing negative for instability. Valgus Stress testing negative for instability. Patellar Compression testing is positive for pain or crepitus. Extensor Mechanism is intact. Lower extremities:  She has 5/5 motor strength of bilateral lower extremities. She has a negative straight leg raise, bilaterally. Deep tendon reflexes at knees and achilles are 2+. Sensation is intact to light touch L3 to S1 bilaterally. She has no clonus. Examination of the lower extremities shows intact perfusion to both lower extremities. She has no cyanosis and digigts are warm to touch, capillary refill is less than 2 seconds. She has marked edema noted BLE. She has intact skin without lacerations or abrasions, no significant erythema, rashes or skin lesions. X Rays: were performed in the office today:   AP Standing, Lateral and Sunrise views of left knee:   No acute fractures or dislocations noted. Knee x-rays demonstrate osteoarthritis. Medial compartment-    4/4 Kellgren and Seamus Classification. Lateral compartment-    2/4 Kellgren and Seamus Classification. PF compartment-          3/4 Kellgren and Seamus Classification. P Standing, Lateral and Sunrise views of right knee:   No acute fractures or dislocations noted.    Knee x-rays demonstrate osteoarthritis. Medial compartment-    4/4 Kellgren and Seamus Classification. Lateral compartment-    2/4 Kellgren and Seamus Classification. PF compartment-          3/4 Kellgren and Seamus Classification. Additional Tests reviewed: None. Additional Outside Records reviewed: None. Diagnosis:       ICD-10-CM    1. Pain  R52 XR KNEE BILATERAL STANDING     XR KNEE LEFT (1-2 VIEWS)     XR KNEE RIGHT (1-2 VIEWS)   2. Arthritis of knee, left  M17.12    3. Arthritis of knee, right  M17.11         Assessment and Plan:       Assessment:  She has advanced, end-stage osteoarthritis involving the medial compartments of both left and right knee. She has advanced patellofemoral arthritis also. I had an extensive discussion with Ms. Melita Calloway regarding the natural history, etiology, and long term consequences of her condition. I have presented reasonable alternatives to the patient's proposed care, treatment, and services. Risks and benefits of the treatment options also reviewed in detail. I have outlined a treatment plan with them. She has had full opportunity to ask her questions. I have answered them all to her satisfaction. I feel that Ms. Nancy Umaña understands our discussion today     Weight loss, activity modification, home exercise therapy program, NSAID'S, dietary changes have been discussed as a means to help control the symptoms. Non surgical options including cortisone injection, Visco supplementation injection,  Coolief (cooled frequency ablation of the geniculate nerves), stem cell injections, PRP injections were discussed. Surgical option, knee arthroplasty discussed. Plan:  Medications-Tylenol recommended for pain. Offered intra-articular steroid injections today. Patient declined. PT- A home exercise program was instructed today including ROM exercises and strengthening exercises.  The patient verbalized understanding of these exercises as well as the importance of the exercise program to promote return of normal function. If pain intensifies or other problems arise you are to notify the office. Follow up:   Call or return to clinic if these symptoms worsen or fail to improve as anticipated. Kushal Pineda PA-C   Senior Physician Assistant   [facility]                                         Disclaimer: This note was generated with use of a verbal recognition program (DRAGON) and an attempt was made to check for errors. It is possible that there are still dictated errors within this office note. If so, please bring any significant errors to my attention for an addendum. All efforts were made to ensure that this office note is accurate.

## 2022-08-04 ENCOUNTER — OFFICE VISIT (OUTPATIENT)
Dept: INTERNAL MEDICINE CLINIC | Age: 57
End: 2022-08-04
Payer: COMMERCIAL

## 2022-08-04 VITALS
WEIGHT: 293 LBS | OXYGEN SATURATION: 97 % | HEIGHT: 66 IN | SYSTOLIC BLOOD PRESSURE: 130 MMHG | BODY MASS INDEX: 47.09 KG/M2 | TEMPERATURE: 99.3 F | DIASTOLIC BLOOD PRESSURE: 75 MMHG | HEART RATE: 88 BPM

## 2022-08-04 DIAGNOSIS — Z00.01 ENCOUNTER FOR WELL ADULT EXAM WITH ABNORMAL FINDINGS: Primary | ICD-10-CM

## 2022-08-04 DIAGNOSIS — Z01.00 ROUTINE EYE EXAM: ICD-10-CM

## 2022-08-04 DIAGNOSIS — R22.43 LOCALIZED SWELLING OF BOTH LOWER LEGS: ICD-10-CM

## 2022-08-04 DIAGNOSIS — G89.29 CHRONIC PAIN OF BOTH KNEES: ICD-10-CM

## 2022-08-04 DIAGNOSIS — M25.562 CHRONIC PAIN OF BOTH KNEES: ICD-10-CM

## 2022-08-04 DIAGNOSIS — M25.561 CHRONIC PAIN OF BOTH KNEES: ICD-10-CM

## 2022-08-04 LAB
A/G RATIO: 0.8 (ref 1.1–2.2)
ALBUMIN SERPL-MCNC: 3.8 G/DL (ref 3.4–5)
ALP BLD-CCNC: 98 U/L (ref 40–129)
ALT SERPL-CCNC: 13 U/L (ref 10–40)
ANION GAP SERPL CALCULATED.3IONS-SCNC: 13 MMOL/L (ref 3–16)
AST SERPL-CCNC: 18 U/L (ref 15–37)
BASOPHILS ABSOLUTE: 0 K/UL (ref 0–0.2)
BASOPHILS RELATIVE PERCENT: 0.6 %
BILIRUB SERPL-MCNC: 0.4 MG/DL (ref 0–1)
BILIRUBIN DIRECT: <0.2 MG/DL (ref 0–0.3)
BILIRUBIN, INDIRECT: ABNORMAL MG/DL (ref 0–1)
BUN BLDV-MCNC: 13 MG/DL (ref 7–20)
CALCIUM SERPL-MCNC: 9.9 MG/DL (ref 8.3–10.6)
CHLORIDE BLD-SCNC: 103 MMOL/L (ref 99–110)
CHOLESTEROL, TOTAL: 156 MG/DL (ref 0–199)
CO2: 22 MMOL/L (ref 21–32)
CREAT SERPL-MCNC: 0.8 MG/DL (ref 0.6–1.1)
EOSINOPHILS ABSOLUTE: 0.1 K/UL (ref 0–0.6)
EOSINOPHILS RELATIVE PERCENT: 1.8 %
GFR AFRICAN AMERICAN: >60
GFR NON-AFRICAN AMERICAN: >60
GLUCOSE BLD-MCNC: 89 MG/DL (ref 70–99)
HCT VFR BLD CALC: 32.9 % (ref 36–48)
HDLC SERPL-MCNC: 36 MG/DL (ref 40–60)
HEMOGLOBIN: 10.7 G/DL (ref 12–16)
LDL CHOLESTEROL CALCULATED: 101 MG/DL
LYMPHOCYTES ABSOLUTE: 0.7 K/UL (ref 1–5.1)
LYMPHOCYTES RELATIVE PERCENT: 11.4 %
MCH RBC QN AUTO: 24.8 PG (ref 26–34)
MCHC RBC AUTO-ENTMCNC: 32.5 G/DL (ref 31–36)
MCV RBC AUTO: 76.5 FL (ref 80–100)
MONOCYTES ABSOLUTE: 0.4 K/UL (ref 0–1.3)
MONOCYTES RELATIVE PERCENT: 6.6 %
NEUTROPHILS ABSOLUTE: 4.6 K/UL (ref 1.7–7.7)
NEUTROPHILS RELATIVE PERCENT: 79.6 %
PDW BLD-RTO: 18.2 % (ref 12.4–15.4)
PLATELET # BLD: 309 K/UL (ref 135–450)
PMV BLD AUTO: 8.1 FL (ref 5–10.5)
POTASSIUM SERPL-SCNC: 4.3 MMOL/L (ref 3.5–5.1)
RBC # BLD: 4.3 M/UL (ref 4–5.2)
SODIUM BLD-SCNC: 138 MMOL/L (ref 136–145)
TOTAL PROTEIN: 8.5 G/DL (ref 6.4–8.2)
TRIGL SERPL-MCNC: 97 MG/DL (ref 0–150)
TSH REFLEX: 2.18 UIU/ML (ref 0.27–4.2)
VITAMIN D 25-HYDROXY: 8.2 NG/ML
VLDLC SERPL CALC-MCNC: 19 MG/DL
WBC # BLD: 5.8 K/UL (ref 4–11)

## 2022-08-04 PROCEDURE — 36415 COLL VENOUS BLD VENIPUNCTURE: CPT | Performed by: INTERNAL MEDICINE

## 2022-08-04 PROCEDURE — 99396 PREV VISIT EST AGE 40-64: CPT | Performed by: INTERNAL MEDICINE

## 2022-08-04 SDOH — ECONOMIC STABILITY: FOOD INSECURITY: WITHIN THE PAST 12 MONTHS, YOU WORRIED THAT YOUR FOOD WOULD RUN OUT BEFORE YOU GOT MONEY TO BUY MORE.: NEVER TRUE

## 2022-08-04 SDOH — ECONOMIC STABILITY: FOOD INSECURITY: WITHIN THE PAST 12 MONTHS, THE FOOD YOU BOUGHT JUST DIDN'T LAST AND YOU DIDN'T HAVE MONEY TO GET MORE.: NEVER TRUE

## 2022-08-04 ASSESSMENT — ENCOUNTER SYMPTOMS
ABDOMINAL PAIN: 0
NAUSEA: 0
BLOOD IN STOOL: 0
SHORTNESS OF BREATH: 0
SORE THROAT: 0
COUGH: 0
VOMITING: 0

## 2022-08-04 ASSESSMENT — PATIENT HEALTH QUESTIONNAIRE - PHQ9
SUM OF ALL RESPONSES TO PHQ QUESTIONS 1-9: 0
SUM OF ALL RESPONSES TO PHQ QUESTIONS 1-9: 0
SUM OF ALL RESPONSES TO PHQ9 QUESTIONS 1 & 2: 0
SUM OF ALL RESPONSES TO PHQ QUESTIONS 1-9: 0
2. FEELING DOWN, DEPRESSED OR HOPELESS: 0
1. LITTLE INTEREST OR PLEASURE IN DOING THINGS: 0
SUM OF ALL RESPONSES TO PHQ QUESTIONS 1-9: 0

## 2022-08-04 ASSESSMENT — SOCIAL DETERMINANTS OF HEALTH (SDOH): HOW HARD IS IT FOR YOU TO PAY FOR THE VERY BASICS LIKE FOOD, HOUSING, MEDICAL CARE, AND HEATING?: VERY HARD

## 2022-08-04 NOTE — PROGRESS NOTES
2022    Clarke Smiley (:  1965) is a 61 y.o. female, here for a preventive medicine evaluation. Patient Active Problem List   Diagnosis    Thoracic aortic aneurysm without rupture ([facility] [address]    Essential hypertension    Morbid obesity with BMI of 45.0-49.9, adult (HCC)    Localized swelling of lower extremity    Incidental lung nodule, > 3mm and < 8mm    Mixed hyperlipidemia    Prediabetes    Axillary lymphadenopathy    Snoring    Lung nodules    Arthritis of knee, right    Arthritis of knee, left       Review of Systems   Constitutional:  Negative for fatigue and fever. HENT:  Negative for nosebleeds and sore throat. Respiratory:  Negative for cough and shortness of breath. Cardiovascular:  Positive for leg swelling (Bilateral). Negative for chest pain and palpitations. Gastrointestinal:  Negative for abdominal pain, blood in stool, nausea and vomiting. Musculoskeletal:         Bilateral knee pain   Neurological:  Negative for dizziness and weakness.      Prior to Visit Medications    Not on File        No Known Allergies    Past Medical History:   Diagnosis Date    Back pain     Cataract     Obesity     BMI 45 2020       Past Surgical History:   Procedure Laterality Date    CATARACT REMOVAL Left        Social History     Socioeconomic History    Marital status: Single     Spouse name: Not on file    Number of children: Not on file    Years of education: Not on file    Highest education level: Not on file   Occupational History    Not on file   Tobacco Use    Smoking status: Never    Smokeless tobacco: Never   Vaping Use    Vaping Use: Never used   Substance and Sexual Activity    Alcohol use: No    Drug use: Never    Sexual activity: Not on file   Other Topics Concern    Not on file   Social History Narrative    Not on file     Social Determinants of Health     Financial Resource Strain: High Risk    Difficulty of Paying Living Expenses: Very hard   Food Insecurity: No Food Insecurity    Worried About Running Out of Food in the Last Year: Never true    Ran Out of Food in the Last Year: Never true   Transportation Needs: Not on file   Physical Activity: Not on file   Stress: Not on file   Social Connections: Not on file   Intimate Partner Violence: Not on file   Housing Stability: Not on file        Family History   Problem Relation Age of Onset    Heart Disease Father         age 76    Breast Cancer Maternal Grandmother     Rheum Arthritis Mother        ADVANCE DIRECTIVE: N, <no information>    Vitals:    08/04/22 1032   BP: 130/75   Site: Left Upper Arm   Position: Sitting   Cuff Size: Large Adult   Pulse: 88   Temp: 99.3 °F (37.4 °C)   TempSrc: Temporal   SpO2: 97%   Weight: 294 lb (133.4 kg)   Height: 5' 6\" (1.676 m)     Estimated body mass index is 47.45 kg/m² as calculated from the following:    Height as of this encounter: 5' 6\" (1.676 m). Weight as of this encounter: 294 lb (133.4 kg). Physical Exam  Vitals reviewed. Constitutional:       General: She is not in acute distress. Appearance: Normal appearance. She is obese. HENT:      Head: Normocephalic and atraumatic. Mouth/Throat:      Mouth: Mucous membranes are moist.      Pharynx: Oropharynx is clear. No oropharyngeal exudate or posterior oropharyngeal erythema. Eyes:      General: No scleral icterus. Right eye: No discharge. Left eye: No discharge. Extraocular Movements: Extraocular movements intact. Conjunctiva/sclera: Conjunctivae normal.      Pupils: Pupils are equal, round, and reactive to light. Cardiovascular:      Rate and Rhythm: Normal rate and regular rhythm. Pulses: Normal pulses. Heart sounds: Normal heart sounds. No murmur heard. Pulmonary:      Effort: Pulmonary effort is normal. No respiratory distress. Breath sounds: Normal breath sounds. No stridor. No wheezing. Abdominal:      General: Abdomen is flat.  Bowel sounds are normal. There is no distension. Palpations: Abdomen is soft. Tenderness: There is no abdominal tenderness. There is no guarding or rebound. Musculoskeletal:         General: Normal range of motion. Cervical back: Normal range of motion and neck supple. No tenderness. Right lower leg: Edema present. Left lower leg: Edema present. Skin:     General: Skin is warm. Neurological:      General: No focal deficit present. Mental Status: She is alert and oriented to person, place, and time. Mental status is at baseline. Motor: No weakness. Psychiatric:         Mood and Affect: Mood normal.         Behavior: Behavior normal.       No flowsheet data found.     Lab Results   Component Value Date/Time    CHOLFAST 175 06/26/2020 10:02 AM    TRIGLYCFAST 200 06/26/2020 10:02 AM    HDL 31 06/26/2020 10:02 AM    LDLCALC 104 06/26/2020 10:02 AM    GLUCOSE 102 08/06/2020 10:48 AM    LABA1C 5.7 06/26/2020 10:02 AM       The 10-year ASCVD risk score (Suzy Dominguez, et al., 2013) is: 3.6%    Values used to calculate the score:      Age: 61 years      Sex: Female      Is Non- : No      Diabetic: No      Tobacco smoker: No      Systolic Blood Pressure: 791 mmHg      Is BP treated: No      HDL Cholesterol: 31 mg/dL      Total Cholesterol: 175 mg/dL    Immunization History   Administered Date(s) Administered    COVID-19, PFIZER PURPLE top, DILUTE for use, (age 15 y+), 30mcg/0.3mL 06/17/2021, 07/08/2021    Influenza Virus Vaccine 11/30/2007    Tdap (Boostrix, Adacel) 05/30/2020       Health Maintenance   Topic Date Due    Cervical cancer screen  Never done    Colorectal Cancer Screen  Never done    Breast cancer screen  Never done    Shingles vaccine (1 of 2) Never done    A1C test (Diabetic or Prediabetic)  06/26/2021    COVID-19 Vaccine (3 - Booster for Pfizer series) 12/08/2021    Flu vaccine (1) 09/01/2022    Depression Screen  08/04/2023    Lipids  06/26/2025    DTaP/Tdap/Td vaccine (2 - Td or Tdap) 05/30/2030    Hepatitis C screen  Completed    HIV screen  Completed    Hepatitis A vaccine  Aged Out    Hepatitis B vaccine  Aged Out    Hib vaccine  Aged Out    Meningococcal (ACWY) vaccine  Aged Out    Pneumococcal 0-64 years Vaccine  Aged Out       Assessment & Plan   Encounter for well adult exam with abnormal findings  -     Vitamin D 25 Hydroxy; Future  -     CBC with Auto Differential; Future  -     Comprehensive Metabolic Panel; Future  -     Hepatic Function Panel; Future  -     Hemoglobin A1C; Future  -     Lipid Panel; Future  -     TSH with Reflex; Future  Localized swelling of both lower legs  Chronic problem. Uncontrolled, patient has leakage of fluid from both legs. -     Ifeoma Corral MD Vascular and Endovascular Surgery, [facility]  Chronic pain of both knees  -     Priti Prajapati MD, Orthopedic Surgery, [facility]  Patient denied intra-articular injections and knee replacement in the past.  She wishes to see a new orthopedic doctor for second opinion and to get disability. Routine eye exam  -     [facility] - Mary Alice Alvarado MD, Ophthalmology, [facility]    Return in about 4 weeks (around 9/1/2022).          --Lesley Sabillon MD

## 2022-08-05 DIAGNOSIS — D64.9 ANEMIA, UNSPECIFIED TYPE: ICD-10-CM

## 2022-08-05 DIAGNOSIS — E55.9 VITAMIN D DEFICIENCY: Primary | ICD-10-CM

## 2022-08-05 LAB
ESTIMATED AVERAGE GLUCOSE: 122.6 MG/DL
FERRITIN: 21.3 NG/ML (ref 15–150)
FOLATE: 5.6 NG/ML (ref 4.78–24.2)
HBA1C MFR BLD: 5.9 %
IRON SATURATION: 8 % (ref 15–50)
IRON: 26 UG/DL (ref 37–145)
TOTAL IRON BINDING CAPACITY: 321 UG/DL (ref 260–445)
VITAMIN B-12: 348 PG/ML (ref 211–911)

## 2022-08-05 RX ORDER — ERGOCALCIFEROL 1.25 MG/1
50000 CAPSULE ORAL WEEKLY
Qty: 12 CAPSULE | Refills: 1 | Status: SHIPPED | OUTPATIENT
Start: 2022-08-05

## 2022-08-06 DIAGNOSIS — D50.9 IRON DEFICIENCY ANEMIA, UNSPECIFIED IRON DEFICIENCY ANEMIA TYPE: Primary | ICD-10-CM

## 2022-08-06 RX ORDER — FERROUS SULFATE 325(65) MG
325 TABLET ORAL
Qty: 90 TABLET | Refills: 1 | Status: SHIPPED | OUTPATIENT
Start: 2022-08-06

## 2022-08-11 ENCOUNTER — OFFICE VISIT (OUTPATIENT)
Dept: ORTHOPEDIC SURGERY | Age: 57
End: 2022-08-11
Payer: COMMERCIAL

## 2022-08-11 VITALS — WEIGHT: 293 LBS | HEIGHT: 66 IN | BODY MASS INDEX: 47.09 KG/M2

## 2022-08-11 DIAGNOSIS — I87.2 VENOUS STASIS DERMATITIS OF BOTH LOWER EXTREMITIES: ICD-10-CM

## 2022-08-11 DIAGNOSIS — M25.561 CHRONIC PAIN OF RIGHT KNEE: Primary | ICD-10-CM

## 2022-08-11 DIAGNOSIS — M25.562 CHRONIC PAIN OF LEFT KNEE: ICD-10-CM

## 2022-08-11 DIAGNOSIS — G89.29 CHRONIC PAIN OF LEFT KNEE: ICD-10-CM

## 2022-08-11 DIAGNOSIS — M17.12 ARTHRITIS OF LEFT KNEE: ICD-10-CM

## 2022-08-11 DIAGNOSIS — M17.11 ARTHRITIS OF RIGHT KNEE: ICD-10-CM

## 2022-08-11 DIAGNOSIS — G89.29 CHRONIC PAIN OF RIGHT KNEE: Primary | ICD-10-CM

## 2022-08-11 PROCEDURE — G8417 CALC BMI ABV UP PARAM F/U: HCPCS | Performed by: ORTHOPAEDIC SURGERY

## 2022-08-11 PROCEDURE — G8427 DOCREV CUR MEDS BY ELIG CLIN: HCPCS | Performed by: ORTHOPAEDIC SURGERY

## 2022-08-11 PROCEDURE — 3017F COLORECTAL CA SCREEN DOC REV: CPT | Performed by: ORTHOPAEDIC SURGERY

## 2022-08-11 PROCEDURE — 1036F TOBACCO NON-USER: CPT | Performed by: ORTHOPAEDIC SURGERY

## 2022-08-11 PROCEDURE — 99203 OFFICE O/P NEW LOW 30 MIN: CPT | Performed by: ORTHOPAEDIC SURGERY

## 2022-08-11 NOTE — PROGRESS NOTES
Betina Jang is seen today for severe bilateral knee pain. She rates her pain as 10 out of 10. Is been going on for many years. Left is worse than right. She uses a cane for the last year. She says she is never had treatment. She is hoping to be disabled per her report. She has multiple medical problems including \"leaky legs\". She does not currently work. History: Patient's relevant past family, medical, and social history are reviewed as part of today's visit. ROS of pertinent positives and negatives as above; otherwise negative. General Exam:    Vitals: Height 5' 6\" (1.676 m), weight 294 lb (133.4 kg). He is morbidly obese. Constitutional: Patient is adequately groomed with no evidence of malnutrition  Mental Status: The patient is oriented to time, place and person. The patient's mood and affect are appropriate. Gait:  Patient walks with shuffling, antalgic gait with the use of a cane in the right arm  Lymphatic: The lymphatic examination bilaterally reveals all areas to be without enlargement or induration. Vascular: Examination reveals no swelling or calf tenderness. Peripheral pulses are palpable and 2+. Neurological: The patient has good coordination. There is no weakness or sensory deficit. Skin:    Head/Neck: inspection reveals no rashes, ulcerations or lesions. Trunk:  inspection reveals no rashes, ulcerations or lesions. Right Lower Extremity: inspection reveals no rashes, ulcerations or lesions. Left Lower Extremity: inspection reveals no rashes, ulcerations or lesions. Right leg has moderate tibial erythema and scaly plaques throughout with range of motion of the right knee 0 to about 90 degrees with severe crepitation and medial joint line pain. Left leg has less erythema but significant scaly plaques throughout the shin with pitting edema range of motion 0 to about 85 degrees with severe crepitation.     Bilateral knee x-rays from October were reviewed showing end-stage bone-on-bone medial and patellofemoral arthritis with varus alignment of both knees. Assessment: End-stage arthritic change bilateral knees with ongoing venous stasis and intermittent scaling plaques. Plan: At this point, I am not at all comfortable proceeding with injections because of her venous stasis and concerned that any superficial infection could spread. She has a vascular surgery appointment in the next couple of days and needs to get this situated. Once that is cleared up we could proceed with injections. Ultimately she will require arthroplasty. Informed him that I am not the doctor to declare disability.

## 2022-08-25 ENCOUNTER — OFFICE VISIT (OUTPATIENT)
Dept: VASCULAR SURGERY | Age: 57
End: 2022-08-25
Payer: COMMERCIAL

## 2022-08-25 VITALS — HEIGHT: 66 IN | BODY MASS INDEX: 47.45 KG/M2

## 2022-08-25 DIAGNOSIS — I89.0 LYMPHEDEMA: Primary | ICD-10-CM

## 2022-08-25 PROCEDURE — G8417 CALC BMI ABV UP PARAM F/U: HCPCS | Performed by: STUDENT IN AN ORGANIZED HEALTH CARE EDUCATION/TRAINING PROGRAM

## 2022-08-25 PROCEDURE — 3017F COLORECTAL CA SCREEN DOC REV: CPT | Performed by: STUDENT IN AN ORGANIZED HEALTH CARE EDUCATION/TRAINING PROGRAM

## 2022-08-25 PROCEDURE — 1036F TOBACCO NON-USER: CPT | Performed by: STUDENT IN AN ORGANIZED HEALTH CARE EDUCATION/TRAINING PROGRAM

## 2022-08-25 PROCEDURE — 99203 OFFICE O/P NEW LOW 30 MIN: CPT | Performed by: STUDENT IN AN ORGANIZED HEALTH CARE EDUCATION/TRAINING PROGRAM

## 2022-08-25 PROCEDURE — G8427 DOCREV CUR MEDS BY ELIG CLIN: HCPCS | Performed by: STUDENT IN AN ORGANIZED HEALTH CARE EDUCATION/TRAINING PROGRAM

## 2022-08-25 NOTE — PROGRESS NOTES
Lacy Mendenhall (:  1965) is a 61 y.o. female,New patient, here for evaluation of the following chief complaint(s):  No chief complaint on file. ASSESSMENT/PLAN:  1. Lymphedema       This is a 61year old female patient who presents with lymphedema. Will proceed with venous reflux testing to rule out venous insufficiency as a contributor. Have given her a new prescription for stockings. Recommend she discuss her chest pain and shortness of breath further with her primary care provider. From the perspective of lymphedema, there is no surgical solution but rather consistent compression which could included lymphatic pumps at home for which she would be possibly a reasonable candidate; will initiate that process as well. However this would be contraindicated in the presence of congestive heart failure. . All questions answered. Subjective   SUBJECTIVE/OBJECTIVE:  This is a 61year old female patient who presents to the office today to discuss leg swelling with some associated redness. Her leg swelling is not new. She has had it for many years. However it has steadily become more prominent. It does continue into her feet. She denies any leg or groin surgeries. She denies any nonhealing wounds. She has never had a venous evaluation. She states that she has old compression stockings that she will occasionally use. She does have occasional chest pain and shortness of breath. She is apparently supposed to have a colonoscopy and upper endoscopy as well as possible knee surgery which is all pending for now until her swelling is treated. Objective   Physical Exam  Constitutional:       Appearance: She is obese. Cardiovascular:      Rate and Rhythm: Normal rate and regular rhythm. Pulses: Normal pulses. Pulmonary:      Effort: Pulmonary effort is normal. No respiratory distress. Musculoskeletal:      Right lower leg: Edema (non pitting) present.       Left lower leg: Edema (non pitting)

## 2022-09-06 ENCOUNTER — PROCEDURE VISIT (OUTPATIENT)
Dept: SURGERY | Age: 57
End: 2022-09-06
Payer: COMMERCIAL

## 2022-09-06 DIAGNOSIS — I89.0 LYMPHEDEMA: ICD-10-CM

## 2022-09-06 PROCEDURE — 93970 EXTREMITY STUDY: CPT | Performed by: SURGERY
